# Patient Record
Sex: FEMALE | Race: BLACK OR AFRICAN AMERICAN | URBAN - METROPOLITAN AREA
[De-identification: names, ages, dates, MRNs, and addresses within clinical notes are randomized per-mention and may not be internally consistent; named-entity substitution may affect disease eponyms.]

---

## 2024-11-01 ENCOUNTER — APPOINTMENT (RX ONLY)
Dept: URBAN - METROPOLITAN AREA OTHER 6 | Facility: OTHER | Age: 49
Setting detail: DERMATOLOGY
End: 2024-11-01

## 2024-11-01 DIAGNOSIS — L91.0 HYPERTROPHIC SCAR: ICD-10-CM

## 2024-11-01 DIAGNOSIS — L50.0 ALLERGIC URTICARIA: ICD-10-CM

## 2024-11-01 PROCEDURE — ? ADDITIONAL NOTES

## 2024-11-01 PROCEDURE — ? INTRALESIONAL KENALOG

## 2024-11-01 PROCEDURE — ? COUNSELING

## 2024-11-01 PROCEDURE — 11901 INJECT SKIN LESIONS >7: CPT

## 2024-11-01 PROCEDURE — 99202 OFFICE O/P NEW SF 15 MIN: CPT | Mod: 25

## 2024-11-01 ASSESSMENT — LOCATION DETAILED DESCRIPTION DERM
LOCATION DETAILED: LEFT POSTERIOR SHOULDER
LOCATION DETAILED: RIGHT POSTERIOR SHOULDER
LOCATION DETAILED: LEFT MEDIAL SUPERIOR CHEST
LOCATION DETAILED: SUPERIOR THORACIC SPINE
LOCATION DETAILED: LEFT LATERAL MANDIBULAR CHEEK
LOCATION DETAILED: LEFT SUPERIOR UPPER BACK
LOCATION DETAILED: RIGHT SUPERIOR LATERAL UPPER BACK
LOCATION DETAILED: RIGHT LATERAL SUPERIOR CHEST

## 2024-11-01 ASSESSMENT — LOCATION SIMPLE DESCRIPTION DERM
LOCATION SIMPLE: RIGHT BACK
LOCATION SIMPLE: LEFT SHOULDER
LOCATION SIMPLE: LEFT UPPER BACK
LOCATION SIMPLE: LEFT CHEEK
LOCATION SIMPLE: RIGHT SHOULDER
LOCATION SIMPLE: UPPER BACK
LOCATION SIMPLE: CHEST

## 2024-11-01 ASSESSMENT — LOCATION ZONE DERM
LOCATION ZONE: ARM
LOCATION ZONE: TRUNK
LOCATION ZONE: FACE

## 2024-11-01 NOTE — HPI: SCAR
How Severe Is Your Scar?: moderate
Is This A New Presentation, Or A Follow-Up?: Scars
Additional History: Pt states she was receiving steroid injections before

## 2024-11-01 NOTE — PROCEDURE: INTRALESIONAL KENALOG
Detail Level: Detailed
Which Kenalog Vial Was Used?: Kenalog 10 mg/ml (5 ml vial)
Lot # For Kenalog (Optional): [id number]
How Many Mls Were Removed From The 40 Mg/Ml (1ml) Vial When Preparing The Injectable Solution?: 0
Medical Necessity Clause: This procedure was medically necessary because the lesions that were treated were:
Concentration Of Kenalog Solution Injected (Mg/Ml): 10.0
Kenalog Type Of Vial: Single Dose
Ndc# For Jacob Only: [address]-07
Administered By (Optional): Benita Garcia PA-C
Kenalog Preparation: Kenalog
Validate Note Data When Using Inventory: Yes
Total Volume (Ccs): 6
Bill For Wasted Drug (Kenalog)?: no
Consent: The risks of atrophy were reviewed with the patient.
Expiration Date For Kenalog (Optional): March 2026

## 2024-11-01 NOTE — PROCEDURE: ADDITIONAL NOTES
Render Risk Assessment In Note?: no
Detail Level: Simple
Additional Notes: NOTE: \\nFive minutes after injecting 6 cc total of Kenalog 10 distributed over 10 large keloids, patient stated she was beginning to feel hot and itchy. I examined her and noticed she was developing hives and was erythematous. Patient then informed me during two previous injections she had to take an antihistamine due to mild itchiness. Patient told me she felt like her face was starting to feel swollen. When asked if she was having trouble breathing, patient said “no”. Patient was given water by my MA.  \\n\\nI went to discuss the situation with Dr. Law. Giulia stayed in the room with the patient and confirmed she did not have any drug allergies. Patient said “no”. Dr. Law came in to evaluate the patient as well. Dr. Law asked the patient if she was having trouble breathing. Patient denied. Dr. Law evaluated patient and informed her she would need to go to the urgent care right away as we were concerned she might be developing an allergic reaction.  \\n\\nPatient kept apologizing, but understood she needed to go to urgent care. Patient stated she could drive over to the urgent care across the street (0.2 miles from office). Patient stated multiple times she could drive herself to urgent care. Informed her we would call the urgent care to let them know she was coming and call to confirm she made it there. Temi called [facility] urgent [facility] and gave them the patient information. [facility] informed us they would evaluate her.  \\n\\nPatient told Giulia the exam room was hot and she wanted to leave. Patient was trying to leave the office before we could confirm urgent care could see her. Giulia walked with patient to the front and had her sit by the checkout desk. I informed patient we called the urgent care and let them know she was on the way. I asked patient if she was comfortable to drive and offered to call an ambulance for her. Patient declined the ambulance and stated once again she could drive across the street to the urgent care. Patient then got up from her chair and walked out of the office into the parking lot.